# Patient Record
Sex: MALE | Race: WHITE
[De-identification: names, ages, dates, MRNs, and addresses within clinical notes are randomized per-mention and may not be internally consistent; named-entity substitution may affect disease eponyms.]

---

## 2019-08-09 ENCOUNTER — HOSPITAL ENCOUNTER (EMERGENCY)
Dept: HOSPITAL 25 - UCCORT | Age: 24
Discharge: HOME HEALTH | End: 2019-08-09
Payer: COMMERCIAL

## 2019-08-09 VITALS — DIASTOLIC BLOOD PRESSURE: 86 MMHG | SYSTOLIC BLOOD PRESSURE: 151 MMHG

## 2019-08-09 DIAGNOSIS — R13.10: Primary | ICD-10-CM

## 2019-08-09 PROCEDURE — G0463 HOSPITAL OUTPT CLINIC VISIT: HCPCS

## 2019-08-09 PROCEDURE — 99202 OFFICE O/P NEW SF 15 MIN: CPT

## 2019-08-09 NOTE — UC
Throat Pain/Nasal Richard HPI





- HPI Summary


HPI Summary: 





Pt presents with c/o worsening throat and jaw pain over the last 4 days. Pt 

states that he is UTD with tetanus vaccine, is able to breath, is afebrile and 

denies sob.  Pt states that he has difficulty breathing while sleeping last 

night.  Pt states it is "difficult to drink fluid". Denies recent insect bite, 

or injury  





- History of Current Complaint


Chief Complaint: UCRespiratory


Stated Complaint: ST,SWOLLEN TONGUE,HARD TO SWALLOW


Time Seen by Provider: 08/09/19 17:18


Hx Obtained From: Patient


Onset/Duration: Gradual Onset, Lasting Days, Still Present, Worse Since - osnet


Severity: Severe


Pain Intensity: 8


Cough: None


Associated Signs & Symptoms: Positive: Dysphagia





- Epiglottits Risk Factors


Epiglottis Risk Factors: Muffled Voice, Worse w/Recumbent Position





- Allergies/Home Medications


Allergies/Adverse Reactions: 


 Allergies











Allergy/AdvReac Type Severity Reaction Status Date / Time


 


No Known Allergies Allergy   Verified 08/09/19 17:04











Home Medications: 


 Home Medications





NK [No Home Medications Reported]  08/09/19 [History Confirmed 08/09/19]











PMH/Surg Hx/FS Hx/Imm Hx


Previously Healthy: Yes





- Surgical History


Surgical History: None





- Family History


Known Family History: Positive: Cardiac Disease





- Social History


Occupation: Employed Full-time


Lives: With Family


Alcohol Use: None


Substance Use Type: None


Smoking Status (MU): Never Smoked Tobacco


Have You Smoked in the Last Year: No





- Immunization History


Vaccination Up to Date: Yes





Review of Systems


All Other Systems Reviewed And Are Negative: Yes


Constitutional: Positive: Negative


Skin: Positive: Negative


Eyes: Positive: Negative


ENT: Positive: Sore Throat


Respiratory: Positive: Negative


Cardiovascular: Positive: Negative


Gastrointestinal: Positive: Negative


Genitourinary: Positive: Negative


Motor: Positive: Negative


Neurovascular: Positive: Negative


Musculoskeletal: Positive: Negative


Neurological: Positive: Negative


Psychological: Positive: Negative


Is Patient Immunocompromised?: No





Physical Exam


Triage Information Reviewed: Yes


Appearance: Ill-Appearing, Pain Distress


Vital Signs: 


 Initial Vital Signs











Temp  98.2 F   08/09/19 17:00


 


Pulse  80   08/09/19 17:00


 


Resp  18   08/09/19 17:00


 


BP  151/86   08/09/19 17:00


 


Pulse Ox  100   08/09/19 17:00











Vital Signs Reviewed: Yes


Eye Exam: Normal


ENT: Positive: Tonsillar swelling, Other - pt unable to open mouth wide for 

exam c/o of pain


Dental Exam: Normal


Neck exam: Normal


Neck: Positive: Supple, Nontender, No Lymphadenopathy


Respiratory Exam: Normal


Respiratory: Positive: No respiratory distress


Musculoskeletal Exam: Normal


Neurological Exam: Normal


Psychological Exam: Normal


Skin Exam: Normal





Throat Pain/Nasal Course/Dx





- Differential Dx/Diagnosis


Differential Diagnosis/HQI/PQRI: Peritonsillar Abscess, Tonsillitis, Other - 

epiglottis


Provider Diagnosis: 


 Dysphagia








- Physician Notification/Consults


Discussed Patient Care With: carmelo garcia - accepted pt


Time Discussed With Above Provider: 17:20 - I asked if ENT on call and told no, 

but they would accept pt for evaluation





Discharge





- Sign-Out/Discharge


Documenting (check all that apply): Patient Departure


All imaging exams completed and their final reports reviewed: No Studies





- Discharge Plan


Condition: Stable


Disposition: HOME-RECOMMEND TO ED


Patient Education Materials:  Tonsillitis (ED), Dysphagia (ED)


Referrals: 


[facility] [place] [facility] [place] - If Needed


No Primary Care Phys,NOPCP [Primary Care Provider] - 


Additional Instructions: 


IT IS RECOMMENDED THAT YOU GO DIRECTLY TO THE CLOSEST EMERGENCY ROOM 

IMMEDIATELY.  





- Billing Disposition and Condition


Condition: STABLE


Disposition: Home-Recommend to ED